# Patient Record
(demographics unavailable — no encounter records)

---

## 2017-01-01 NOTE — RADIOLOGY REPORT
EXAMINATION:
XR CHEST
 
CLINICAL INFORMATION:
Cough, green sputum. History of neuroendocrine cancer. Rule out pneumonia.
 
COMPARISON:
05/23/2015.
 
TECHNIQUE:
PA and lateral views of the chest were obtained.
 
FINDINGS:
The cardiac and mediastinal silhouettes are normal in appearance. There is
mild peribronchial thickening. The lungs and pleural spaces are clear.
 
Left cervical rib is redemonstrated synostosis to the left first thoracic rib.
 
IMPRESSION:
No acute cardiopulmonary abnormalities demonstrated.

## 2017-01-01 NOTE — ED SKIN/ALLERGY COMPLAINT
History of Present Illness
 
General
Chief Complaint: Skin Rash/ Abcess
Stated Complaint: HIVES
Source: patient
Exam Limitations: no limitations
 
Vital Signs & Intake/Output
Vital Signs & Intake/Output
 Vital Signs
 
 
Date Time Temp Pulse Resp B/P Pulse O2 O2 Flow FiO2
 
     Ox Delivery Rate 
 
 0751 97.4 85 16 156/78 987 Room Air  
 
 
Allergies
Coded Allergies:
MDX - Cephalosporin (CEPHALOSPORIN) (Severe, ANAPHYLAXIS 05/23/15)
 
Reconcile Medications
Ciclesonide (Omnaris) 50 MCG SPRAY.PUMP   2 SPRAY MAHENDRA PRN SINUSES  (Reported)
Hydroxyzine Pamoate (Vistaril) 50 MG CAPSULE   1 CAP PO TID PRN URTICARIA
Insulin Detemir (Levemir Flextouch) 100 UNIT/ML (3 ML) INSULN.PEN   14 UNITS SC 
QAM DIABETES  (Reported)
Levalbuterol Tartrate (Xopenex HFA) 45 MCG/ACTUATION HFA.AER.AD   2 PUF INH Q4-6
PRN PRN ASTHMA  (Reported)
Levofloxacin (Levaquin) 500 MG TABLET   1 TAB PO QDAY BRONCHITIS
Lorazepam (Ativan) 1 MG TAB   1 TAB PO PRN ANXIETY  (Reported)
Lovastatin (Mevacor) 20 MG TABLET   60 MG PO QPM CHOLESTEROL  (Reported)
METFORMIN HCL (Metformin HCl ER) 1,000 MG TAB.ER.24   1.5 TAB PO AD DIABETES  (
Reported)
Methimazole 5 MG TABLET   0.5 TAB PO DAILY THYROID  (Reported)
Octreotide Acetate (Sandostatin) (Unknown Strength) AMPUL   (Unknown Dose) IM 
Q30D CANCER-NEUROENDOCRINE  (Reported)
Oxycodone Hydrochloride (Oxycodone) 5 MG TAB   2-3 TAB PO QPM PAIN  (Reported)
Pantoprazole Sodium 40 MG TABLET.DR   1 TAB PO QPM GI  (Reported)
Prednisone 20 MG TABLET   2 TAB PO QDAY BRONCHITIS
Ramipril (Altace) 5 MG CAPSULE   1 CAP PO DAILY PROTECT KIDNEYS  (Reported)
 
Triage Note:
PT STATES SHE HAS CA AND DR. KAY TOLD HER THAT
 "IT'S LIKE SHE IS ALLERGIC TO HERSELF".  PT STATES
 SHE NOTICES SHE HAD HIVES THIS AM AND SHE HAS BEEN
 PUTTING CORTISONE CREAM ON IT FOR THE PAST WEEK.
 PT STATES SHE DOES HAVE ANAPHALACSIS AT TIMES.  PT
 HAS NO DIFFICULTY BREATHING AT THIS TIME.  PT
 STATES SHE TOOK BENADRYL LAST NIGHT PRIOR TO GOING
 TO BED AND STATES THIS AM THE HIVES GOT WORSE.
Triage Nurses Notes Reviewed? yes
Onset: Gradual
Duration: week(s): (1)
Timing: multiple episodes today
Severity: moderate
Location: torso, extremities
Possible Factors: neuroendocreine cancer
Associated Symptoms: hives, anxiety, dyspnea
HPI:
This is a 61-year-old female with history of neuroendocrine cancer on 
Sandostatin who presents to the ER with chief complaint of urticarial rash on 
her body for the past one week.  She states this morning she was worried she 
might develop anaphylaxis.  She states she took Benadryl last night which helped
with the itching but the hives are still there.  She saw her oncologist Dr. Hickey
in the office last week and he told her it was part of her cancer diagnosis to 
have reactions like this.  She states that she also had an episode of coughing 
up green phlegm is worried that she might develop pneumonia.  Patient is still 
an active daily smoker.
 
Past History
 
Travel History
Traveled to [place] past 21 day No
 
Medical History
Any Pertinent Medical History? see below for history
Neurological: NONE
EENT: NONE
Cardiovascular: NONE
Respiratory: asthma
Gastrointestinal: NONE
Hepatic: NONE
Renal: NONE
Musculoskeletal: NONE
Psychiatric: NONE
Endocrine: diabetes, hyperthyroidism
Blood Disorders: NONE
Cancer(s): NEURO ENDOCRINE CA
GYN/Reproductive: NONE
 
Surgical History
Surgical History: non-contributory
 
Psychosocial History
What is your primary language English
Tobacco Use: Current Daily Use
Daily Tobacco Use Amount/Type: => 5 Cigarettes daily
ETOH Use: denies use
Illicit Drug Use: denies illicit drug use
 
Family History
Hx Contributory? No
 
Review of Systems
 
Review of Systems
Constitutional:
Denies: chills, fever. 
EENTM:
Denies: throat pain. 
Respiratory:
Reports: short of breath.  Denies: cough. 
Cardiovascular:
Denies: chest pain, palpitations, peripheral edema. 
GI:
Denies: abdominal pain, diarrhea, vomiting. 
Genitourinary:
Reports: no symptoms. 
Musculoskeletal:
Reports: no symptoms. 
Skin:
Reports: rash. 
Neurological/Psychological:
Reports: anxiety. 
Hematologic/Endocrine:
Denies: bruising, bleeding, polyuria, polydipsia. 
Immunologic/Allergic:
Denies: splenectomy. 
All Other Systems: Reviewed and Negative
 
Physical Exam
 
Physical Exam
General Appearance: well developed/nourished, alert, awake, anxious, mild 
distress
Head: atraumatic
Eyes:
Bilateral: PERRL, EOMI. 
Ears, Nose, Throat: normal pharynx, normal ENT inspection, hearing grossly 
normal
Neck: normal inspection, supple
Respiratory: normal breath sounds
Cardiovascular: regular rate/rhythm
Peripheral Pulses:
2+ radial (R), 2+ radial (L)
Gastrointestinal: soft, non-tender
Back: normal inspection
Extremities: normal inspection, normal range of motion, no edema
Neurologic/Psych: awake, alert, oriented x 3, normal mood/affect
Skin: intact, normal color, warm/dry, rash
Skin Problem Location: torso
Skin Problem Character: urticarial
Lymphatic: no anterior cervical jennifer
 
Progress
Differential Diagnosis: allergic reaction, SIDE EFFECTS OF CANCER TREATMENTS
Plan of Care:
 Orders
 
 
Procedure Date/time Status
 
XRY-CHEST XRAY, PA AND LATERAL 821 Active
 
 
Diagnostic Imaging:
Viewed by Me: Radiology Read.  Discussed w/RAD: Radiology Read. 
Comments:
PATIENT: ARMANDO TAM  MEDICAL RECORD NO: [id number]
PRESENT AGE: 61  PATIENT ACCOUNT NO: [id number]
: 55  LOCATION: [facility]
ORDERING PHYSICIAN: OKSANA CROWDER MD  
 
  SERVICE DATE: 
EXAM TYPE: RAD - XRY-CHEST XRAY, PA AND LATERAL
 
EXAMINATION:
XR CHEST
 
CLINICAL INFORMATION:
Cough, green sputum. History of neuroendocrine cancer. Rule out pneumonia.
 
COMPARISON:
2015.
 
TECHNIQUE:
PA and lateral views of the chest were obtained.
 
FINDINGS:
The cardiac and mediastinal silhouettes are normal in appearance. There is
mild peribronchial thickening. The lungs and pleural spaces are clear.
 
Left cervical rib is redemonstrated synostosis to the left first thoracic rib.
 
IMPRESSION:
No acute cardiopulmonary abnormalities demonstrated.
 
DICTATED BY: LILIANA FERRARA MD 
DATE/TIME DICTATED:17
:RAD.HERMOSILLO 
DATE/TIME TRANSCRIBED:17
 
CONFIDENTIAL, DO NOT COPY WITHOUT APPROPRIATE AUTHORIZATION.
 
 <Electronically signed in Other Vendor System>                                 
          
                                           SIGNED BY: LILIANA FERRARA MD 17
 
 
 
 
Departure
 
Departure
Time of Disposition: 938
Disposition: HOME OR SELF CARE
Condition: Stable
Clinical Impression
Primary Impression: Urticaria
Referrals:
SATYA ROBBINS MD (PCP/Family)
 
Referred to [facility] as new patient No
Additional Instructions:
Take the Atarax as directed.  Follow-up with your oncologist in the office.  
Return to ER as needed.  Your chest x-ray was normal.  Please stop smoking.
Departure Forms:
Customer Survey
General Discharge Information
Prescriptions:
Current Visit Scripts
Hydroxyzine Pamoate (Vistaril) 1 CAP PO TID PRN URTICARIA
     #30 CAP

## 2017-05-19 NOTE — ED INFLUENZA/URI COMPLAINT
History of Present Illness
 
General
Chief Complaint: Dyspnea (COPD, CHF, Other)
Stated Complaint: BIBA SOB W/ CONGESTION
Source: patient, family, old records
Exam Limitations: no limitations
 
Vital Signs & Intake/Output
Vital Signs & Intake/Output
 Vital Signs
 
 
Date Time Temp Pulse Resp B/P B/P Pulse O2 O2 Flow FiO2
 
     Mean Ox Delivery Rate 
 
2253 98.0 82 18 118/84  97 Room Air  
 
2040      97   
 
2030      98 Room Air  
 
2026 97.9 84 16 156/74  98 Room Air  
 
 
Allergies
Coded Allergies:
Cephalosporins (Severe, ANAPHYLAXIS 17)
 
Reconcile Medications
Alprazolam (Xanax XR) 0.5 MG TAB.ER.24H   1 TAB PO TID PRN ANXIETY  (Reported)
Canagliflozin (Invokana) 100 MG TABLET   1 TAB PO DAILY DM  (Reported)
Ciclesonide (Omnaris) 50 MCG SPRAY.PUMP   2 SPRAY MAHENDRA PRN SINUSES  (Reported)
Clotrimazole 10 MG MARIA LUZ   1 TAB PO 5 TIMES A DAY PRN THRUSH  (Reported)
Epinephrine (Epipen) 0.3 MG/0.3 ML AUTO.INJCT   1 UNIT IM ONCE PRN ANAPHYLAXIS  
(Reported)
Fluticasone Propionate 50 MCG/ACTUATION SPRAY.SUSP   2 SPRAY NASB DAILY ASTHMA  
(Reported)
Hydroxyzine HCl 10 MG TABLET   1 TAB PO DAILY    (Reported)
Insulin Detemir (Levemir Flextouch) 100 UNIT/ML (3 ML) INSULN.PEN   14 UNITS SC 
QAM DM  (Reported)
Levalbuterol Tartrate (Xopenex Hfa) 45 MCG/ACTUATION HFA.AER.AD   2 PUF INH Q4-6
PRN PRN ASTHMA  (Reported)
Loratadine (Claritin) 10 MG CAPSULE   1 TAB PO TID ALLERGIES  (Reported)
Lovastatin (Altoprev) 60 MG TAB.ER.24H   1 TAB PO QPM CHOLESTEROL  (Reported)
Metformin HCl (Metformin HCl ER) 1,000 MG TAB.ER.24   1 TAB PO DAILY DM  (
Reported)
Methimazole 5 MG TABLET   0.5 TAB PO DAILY THYROID  (Reported)
Methylprednisolone. (Medrol) 4 MG TAB.DS.PK   1 DP PO AD INFLAMMATION
     6 on day 1 then reduce by one tablet daily until gone
Pantoprazole Sodium 40 MG TABLET.DR   1 TAB PO QPM GI  (Reported)
Ramipril (Altace) 5 MG CAPSULE   1 CAP PO DAILY PROTECT KIDNEYS  (Reported)
 
Triage Nurses Notes Reviewed? yes
Onset: Abrupt
Duration: intermittent
Timing: multiple episodes today
Severity: severe
Severity Numbers: 7
HPI:
Patient is a 61-year-old female with past medical history of diabetes, 
hyperthyroidism, hyperlipidemia, anxiety, asthma, allergies, neuro endocrine 
carcinoma who presents emergency room and was today patient woke up in her 
normal state of health and wished this morning patient had acute onset of heart 
palpitations would made patient anxious and short of breath patient took 
treatments of albuterol puffer and had complete resolution of symptoms.  Patient
states that she was outside all day however 2 hours prior to arrival patient had
acute onset of again heart palpitations which made patient anxious and short of 
breath patient also felt chest congestion and head congestion and allergy like 
symptoms where she was brought in by ambulance.  Patient is ever day smoker.  
Denies any fevers chills chest pain arm pain jaw pain nausea vomiting leg 
swelling hemoptysis.
 
(CORAZON MITCHELL)
 
Past History
 
Travel History
Traveled to [place] past 21 day No
 
Medical History
Any Pertinent Medical History? see below for history
Neurological: NONE
EENT: NONE
Cardiovascular: NONE
Respiratory: asthma
Gastrointestinal: NONE
Hepatic: NONE
Renal: NONE
Musculoskeletal: NONE
Psychiatric: NONE
Endocrine: diabetes, hyperthyroidism
Blood Disorders: NONE
Cancer(s): NEURO ENDOCRINE CA
GYN/Reproductive: NONE
 
Surgical History
Surgical History: non-contributory
 
Psychosocial History
What is your primary language English
Tobacco Use: Current Daily Use
Daily Tobacco Use Amount/Type: => 5 Cigarettes daily
 
Family History
Hx Contributory? No
(CORAZON MITCHELL)
 
Review of Systems
 
Review of Systems
Constitutional:
Denies: chills, fever. 
EENTM:
Reports: no symptoms. 
Respiratory:
Reports: see HPI, short of breath. 
Cardiovascular:
Reports: see HPI, palpitations.  Denies: chest pain. 
GI:
Reports: no symptoms. 
Genitourinary:
Reports: no symptoms. 
Musculoskeletal:
Reports: no symptoms. 
Skin:
Reports: no symptoms. 
Neurological/Psychological:
Reports: no symptoms. 
Hematologic/Endocrine:
Reports: no symptoms. 
Immunologic/Allergic:
Reports: no symptoms. 
All Other Systems: Reviewed and Negative
(CORAZON MITCHELL)
 
Physical Exam
 
Physical Exam
General Appearance: anxious
Ears, Nose, Throat: normal ENT inspection, moist mucous membrane, hearing 
grossly normal
Comments:
HEENT: Normal EENT exam, extraocular motion intact, no nystagmus. Pupils equally
round and reactive to light and accommodation. Nose is atraumatic. External 
auditory canal and Tympanic membranes clear. Pharynx normal. No swelling or 
edema. 
Neck: Supple, no lymphadenopathy, normal range of motion without pain or 
tenderness
Back: Nontender, no CVA tenderness.
Cardiovascular: Regular rate and rhythms no murmurs rubs or gallops, normal JVP
Respiratory: Chest nontender. No respiratory distress.  Bilateral mild 
expiratory wheezing
Abdomen: Soft, nontender nondistended, no appreciable organomegaly. Normal bowel
sounds. No ascites
Extremity: No edema, no calf tenderness to palpation, normal and equal pulses.
Neuro: Alert oriented x3, motor sensory normal, cranial nerves II through XII 
grossly intact.
Skin: No appreciable rash on exposed skin, skin is warm and dry.
Psych: Mood and affect is normal, memory and judgment is normal.
 
Core Measures
Severe Sepsis Present: No
Septic Shock Present: No
(ILDA LEMUS,CORAZON)
 
Progress
Differential Diagnosis: influenza, meningitis, neutropenia, otitis, pneumonia, 
pharyngitis, sinusitis
Plan of Care:
 Orders
 
 
Procedure Date/time Status
 
THYROID STIMULATING HORMONE 2054 Complete
 
FREE T4 2054 Complete
 
Telemetry/Cardiac Monitor 2034 Active
 
TROPONIN LEVEL 2018 Complete
 
D-DIMER 2018 Complete
 
COMPREHENSIVE METABOLIC PANEL 2018 Complete
 
CBC WITHOUT DIFFERENTIAL 2018 Complete
 
EKG 2018 Active
 
 
 Laboratory Tests
 
 
 
17:
Anion Gap 11, Estimated GFR > 60, BUN/Creatinine Ratio 20.0, Glucose 163  H, 
Calcium 9.6, Total Bilirubin 0.3, AST 17, ALT 18, Alkaline Phosphatase 82, 
Troponin I < 0.01, Total Protein 7.1, Albumin 4.1, Globulin 3.0, Albumin/
Globulin Ratio 1.4, TSH 0.350, Free T4 1.17, D-Dimer 664  H, CBC w Diff NO MAN 
DIFF REQ, RBC 4.40, MCV 84.7, MCH 27.5, RDW 15.6  H, MPV 8.1, Gran % 60.2, 
Lymphocytes % 26.9, Monocytes % 10.8  H, Eosinophils % 1.6, Basophils % 0.5, 
Absolute Granulocytes 5.9, Absolute Lymphocytes 2.6, Absolute Monocytes 1.1  H, 
Absolute Eosinophils 0.2, Absolute Basophils 0, PUBS MCHC 32.5  L
 
17:
TSH Cancelled, Free T4 Cancelled
2017 9:19:01 PM after nebulizer was administered patient had complete 
resolution of wheezing noted on reexamination.  Oxygen saturation 99% patient no
respiratory distress telemetry monitored noted to be normal sinus rhythm 88 bpm
Patient also is resting comfortably no apparent distress
 
When discussing with patient of an elevated d-dimer and CT angiogram she became 
extremely anxious to have this imaged performance where she was given 1 mg of 
Ativan.
(CORAZON MITCHELL)
Diagnostic Imaging:
Viewed by Me: CT Scan. 
Initial ED EK BPM, NSR
Hand-Off
   Endorsed To:
LONNIE GAVIRIA DO (TBS)
   Endorsed Time: 
   Pending: CT
Comments:
Discussed this with Dr. GAVIRIA for concerns of hand for which patient disposition
plan is pending a CT angiogram.  Patient currently is resting comfortably in no 
apparent distress.  Patient was strongly advised to discontinue smoking and to 
follow-up with disposition and plan dictated and discharge instructions
(CORAZON MITCHELL)
 
Departure
 
Departure
Disposition: HOME OR SELF CARE
Condition: Stable
Clinical Impression
Primary Impression: Palpitations
Secondary Impressions: COPD (chronic obstructive pulmonary disease)
Referrals:
JOE RIVERA,CLAUS ROBBINS MD,SATYA (PCP/Family)
 
PAULINA RIVERA,PARDEEP
 
Additional Instructions:
As discussed please discontinue smoking.  Begin the prescription of Medrol 
Dosepak for inflammation
Continue previous medications especially YOUR breathing medications.  On Monday 
please follow up and establish pulmonologist Dr. Santos and cardiologist Dr. Saldana for your symptoms.  If symptoms worsen return to emergency room
Departure Forms:
Customer Survey
General Discharge Information
Prescriptions:
Current Visit Scripts
Methylprednisolone. (Medrol) 1 DP PO AD  
     #1 DP 
     6 on day 1 then reduce by one tablet daily until gone
 
 
(CORAZON MITCHELL)
 
Departure
Comments
 
 
 
17
12:12 am
The patient was signed out to me by Corazon Molina to follow the CTA.  CTA is 
negative for pulmonary embolism.  Liver densities are unchanged.  She will 
follow-up with her primary care doctor this week or return to the emergency 
department if worse.PATIENT: ARMANDO TAM  MEDICAL RECORD NO: [id number]
PRESENT AGE: 61  PATIENT ACCOUNT NO: [id number]
: 55  LOCATION: [facility]
ORDERING PHYSICIAN: CORAZON LEMUS  
 
  SERVICE DATE: 
EXAM TYPE: CAT - CTA CHEST-PULMONARY EMBOLISM
 
EXAMINATION:
CT ANGIOGRAM CHEST WITH AND WITHOUT CONTRAST (CT PULMONARY ANGIOGRAM FOR PE)
 
CLINICAL INFORMATION:
Shortness of breath. Elevated D-dimer. History of cancer. Pulmonary embolism
symptoms.
 
COMPARISON:
CT chest and abdomen 2017. Chest x-ray 2017.
 
TECHNIQUE:
Prior to contrast administration, noncontrast localization images were
obtained. Subsequently, multidetector volumetric imaging was performed from
the thoracic inlet to below the diaphragms following the administration of 50
mL Optiray 350 intravenous contrast.
No contrast reaction reported.
Sagittal, coronal, and MIP oblique sagittal reformatted images were obtained
on the CT workstation, uploaded to PACS, and reviewed.
Total exam dose-length product 269 mGy-cm.
 
FINDINGS:
 
QUALITY OF STUDY/CONTRAST BOLUS: Satisfactory
 
PULMONARY ARTERIES: No central or segmental pulmonary emboli.
 
THORACIC AORTA: Mild to moderate calcific atherosclerotic disease.
 
LUNG: Minimal groundglass opacity noted within the medial aspect of the right
lobe of the liver seen on axial image 50\E\62 series 3.
 
PLEURA: No pleural effusion or pneumothorax.
 
MEDIASTINUM: Normal heart size. No pericardial effusion. No hilar or
mediastinal lymphadenopathy. No evidence of septal bowing or right heart
strain.
Heterogeneous-appearing thyroid, unchanged.
 
CHEST WALL/AXILLA: No axillary or internal mammary lymphadenopathy.
 
OSSEOUS STRUCTURES: Mild spondylosis of the dorsal spine manifested by
endplate osteophyte formation at multiple disc levels. No change.
 
UPPER ABDOMEN: Multiple small hypodense liver lesions noted throughout the
visualized portion of the liver, unchanged. The largest cysts in the left
lobe also most likely reflect simple cysts.
 
IMPRESSION:
1. No evidence for pulmonary embolism.
 
2. Minimal groundglass opacity in the medial aspect of the right lower lobe
most likely reflects atelectasis rather than an evolving infiltrate or
pneumonia.
 
3. Heterogeneous-appearing thyroid, unchanged.
 
4. Persistent unchanged multiple low-density lesion is noted throughout much
of the liver visualized some of which are too small to clearly characterize.
The larger masses do appear to reflect benign simple cysts.
 
DICTATED BY: MARLO KENNEDY MD 
DATE/TIME DICTATED:17
:RAD.HERMOSILLO 
DATE/TIME TRANSCRIBED:17
 
CONFIDENTIAL, DO NOT COPY WITHOUT APPROPRIATE AUTHORIZATION.
 
 <Electronically signed in Other Vendor System>                                 
          
                                           SIGNED BY: MARLO KENNEDY MD  [id number]
 
 
 
(LONNIE GAVIRIA DO)

## 2017-05-20 NOTE — CT SCAN REPORT
EXAMINATION:
CT ANGIOGRAM CHEST WITH AND WITHOUT CONTRAST (CT PULMONARY ANGIOGRAM FOR PE)
 
CLINICAL INFORMATION:
Shortness of breath. Elevated D-dimer. History of cancer. Pulmonary embolism
symptoms.
 
COMPARISON:
CT chest and abdomen 05/08/2017. Chest x-ray January 2017.
 
TECHNIQUE:
Prior to contrast administration, noncontrast localization images were
obtained. Subsequently, multidetector volumetric imaging was performed from
the thoracic inlet to below the diaphragms following the administration of 50
mL Optiray 350 intravenous contrast.
No contrast reaction reported.
Sagittal, coronal, and MIP oblique sagittal reformatted images were obtained
on the CT workstation, uploaded to PACS, and reviewed.
Total exam dose-length product 269 mGy-cm.
 
FINDINGS:
 
QUALITY OF STUDY/CONTRAST BOLUS: Satisfactory
 
PULMONARY ARTERIES: No central or segmental pulmonary emboli.
 
THORACIC AORTA: Mild to moderate calcific atherosclerotic disease.
 
LUNG: Minimal groundglass opacity noted within the medial aspect of the right
lobe of the liver seen on axial image 50\E\62 series 3.
 
PLEURA: No pleural effusion or pneumothorax.
 
MEDIASTINUM: Normal heart size. No pericardial effusion. No hilar or
mediastinal lymphadenopathy. No evidence of septal bowing or right heart
strain.
Heterogeneous-appearing thyroid, unchanged.
 
CHEST WALL/AXILLA: No axillary or internal mammary lymphadenopathy.
 
OSSEOUS STRUCTURES: Mild spondylosis of the dorsal spine manifested by
endplate osteophyte formation at multiple disc levels. No change.
 
UPPER ABDOMEN: Multiple small hypodense liver lesions noted throughout the
visualized portion of the liver, unchanged. The largest cysts in the left
lobe also most likely reflect simple cysts.
 
IMPRESSION:
1. No evidence for pulmonary embolism.
 
2. Minimal groundglass opacity in the medial aspect of the right lower lobe
most likely reflects atelectasis rather than an evolving infiltrate or
pneumonia.
 
3. Heterogeneous-appearing thyroid, unchanged.
 
4. Persistent unchanged multiple low-density lesion is noted throughout much
of the liver visualized some of which are too small to clearly characterize.
The larger masses do appear to reflect benign simple cysts.

## 2017-05-21 NOTE — ED NEURO DEFICIT/STROKE
History of Present Illness
 
General
Chief Complaint: Neuro Symptoms/ Deficit
Stated Complaint: LEFT SIDED FACIAL NUMBNESS
Source: patient, family, old records
Exam Limitations: no limitations
 
Vital Signs & Intake/Output
Vital Signs & Intake/Output
 ED Intake and Output
 
 
 05/22 0000 05/21 1200
 
Intake Total 0 
 
Output Total  
 
Balance 0 
 
   
 
Intake, Oral 0 
 
Patient 158 lb 
 
Weight  
 
Weight Reported by Patient 
 
Measurement  
 
Method  
 
 
Allergies
Coded Allergies:
Cephalosporins (Severe, ANAPHYLAXIS 05/21/17)
Uncoded Allergies:
METAL (FLUSH, HIVES 05/21/17)
 
Reconcile Medications
Alprazolam 0.5 MG TABLET   1 TAB PO TID ANXIETY  (Reported)
Budesonide/Formoterol Fumarate (Symbicort 160-4.5 Mcg Inhaler) 160 MCG-4.5 MCG/
ACTUATION HFA.AER.AD   1 PUF INH BID PRN ASTHMA  (Reported)
Canagliflozin (Invokana) 100 MG TABLET   1 TAB PO DAILY DM  (Reported)
Ciclesonide (Omnaris) 50 MCG SPRAY.PUMP   2 SPRAY MAHENDRA PRN SINUSES  (Reported)
Clotrimazole 10 MG MARIA LUZ   1 TAB PO 5 TIMES A DAY PRN THRUSH  (Reported)
Epinephrine (Epipen) 0.3 MG/0.3 ML AUTO.INJCT   1 UNIT IM ONCE PRN ANAPHYLAXIS  
(Reported)
Fluticasone Propionate 50 MCG/ACTUATION SPRAY.SUSP   2 SPRAY NASB PRN ALLERGIES 
(Reported)
Hydroxyzine HCl 10 MG TABLET   1 TAB PO DAILY ITCHING  (Reported)
Insulin Detemir (Levemir Flextouch) 100 UNIT/ML (3 ML) INSULN.PEN   14 UNITS SC 
QAM DM  (Reported)
Levalbuterol Tartrate (Xopenex Hfa) 45 MCG/ACTUATION HFA.AER.AD   2 PUF INH Q3H 
PRN ASTHMA  (Reported)
Loratadine (Claritin) 10 MG CAPSULE   1 TAB PO TID ALLERGIES  (Reported)
Lovastatin (Altoprev) 60 MG TAB.ER.24H   1 TAB PO QPM CHOLESTEROL  (Reported)
Metformin HCl (Metformin HCl ER) 1,000 MG TAB.ER.24   1 TAB PO DAILY DM  (
Reported)
Metformin HCl 1,000 MG TABLET   1 TAB PO QPM DM  (Reported)
Methimazole 5 MG TABLET   0.5 TAB PO DAILY THYROID  (Reported)
Methylprednisolone. (Medrol) 4 MG TAB.DS.PK   1 DP PO AD INFLAMMATION
     6 on day 1 then reduce by one tablet daily until gone
Moxifloxacin HCl (Avelox) 400 MG TABLET   1 TAB PO DAILY bronchitis
Pantoprazole Sodium 40 MG TABLET.DR   1 TAB PO QPM GI  (Reported)
Ramipril (Altace) 5 MG CAPSULE   1 CAP PO DAILY PROTECT KIDNEYS  (Reported)
Repaglinide 0.5 MG TABLET   1 TAB PO TID DM  (Reported)
 
Triage Note:
PT TO ED FOR L SIDED FACIAL NUMBNESS/TINGLING THAT
 STARTED THIS MORNING AROUND 1000, THEN WENT AWAY
 AND THEN CAME BACK AROUND 1400. PT SEEN IN THIS ED
 ON FRIDAY AND WAS WORKED UP FOR PE WHICH WAS
 NEGATIVE. PT HX OF NEUROENDOCRINE CANCER,
 CURRENTLY ON CHEMO. ALL NEUROS INTACT, NO FACIAL
 DROOP, OR SLURRED SPEECH NOTED.
Triage Nurses Notes Reviewed? yes
Onset: Abrupt
Duration: hour(s):, intermittent
Timing: recent history
Altered Sensations: left facial
Vision Problem? No
Glaucoma? No
Baseline: alert, oriented x 3
HPI:
61-year-old female comes into emergency room with complaints of left-sided 
facial numbness is been going on since noon today intermittently.  She denies 
any slurred speech confusion or headache.  She denies any numbness or tingling 
to her left lower leg or left left upper extremity.  She says she might have 
some mild tingling in her left pinky finger.  She has no weakness anywhere on 
her body.  She denies any vision changes.  Denies any recent falls or trauma.  
She denies any gait abnormalities.  Denies any vomiting fever or chills.  
Patient was seen here a few nights ago and was put on steroids for possible 
COPD.  Nothing seems to make the symptoms better or worse.  Patient comes in for
further evaluation.
(PHIL BARRAZA)
 
Past History
 
Travel History
Traveled to [place] past 21 day No
 
Medical History
Any Pertinent Medical History? see below for history
Neurological: NONE
EENT: NONE
Cardiovascular: hyperlipidemia
Respiratory: asthma
Gastrointestinal: NONE
Hepatic: NONE
Renal: NONE
Musculoskeletal: NONE
Psychiatric: NONE
Endocrine: diabetes, hyperthyroidism, GOITER
Blood Disorders: NONE
Cancer(s): NEURO ENDOCRINE CA
GYN/Reproductive: NONE
 
Surgical History
Surgical History: non-contributory
 
Psychosocial History
What is your primary language English
Tobacco Use: Current Daily Use
Daily Tobacco Use Amount/Type: => 5 Cigarettes daily
ETOH Use: denies use
Illicit Drug Use: denies illicit drug use
 
Family History
Hx Contributory? No
(PHIL BARRAZA)
 
Review of Systems
 
Review of Systems
Constitutional:
Reports: no symptoms. 
EENTM:
Reports: no symptoms. 
Respiratory:
Reports: no symptoms. 
Cardiovascular:
Reports: no symptoms. 
GI:
Reports: no symptoms. 
Genitourinary:
Reports: no symptoms. 
Musculoskeletal:
Reports: no symptoms. 
Skin:
Reports: no symptoms. 
Neurological/Psychological:
Reports: see HPI. 
Hematologic/Endocrine:
Reports: no symptoms. 
Immunologic/Allergic:
Reports: no symptoms. 
All Other Systems: Reviewed and Negative
(PHIL BARRAZA)
 
Physical Exam
 
Physical Exam
General Appearance: well developed/nourished, no apparent distress, alert, awake
Head: atraumatic, normal appearance
Eyes:
Bilateral: normal appearance, PERRL, EOMI. 
Ears, Nose, Throat: normal ENT inspection, moist mucous membrane, hearing 
grossly normal
Neck: normal inspection, full range of motion
Respiratory: normal breath sounds, no respiratory distress
Cardiovascular: regular rate/rhythm
Gastrointestinal: soft, non-tender
Back: normal inspection, normal range of motion
Extremities: normal range of motion
Psychiatric: awake, alert, oriented x 3
Cranial Nerves: normal hearing, normal speech, PERRL, cranial nerves 2-12 intact
, gross sensation intact
Coordination/Gait: normal finger to nose, normal gait
Motor/Sensory: no motor/sensory deficits
Skin: intact, normal color
 
Core Measures
CVA/TIA Diagnosis: No
Severe Sepsis Present: No
Septic Shock Present: No
(PHIL BARRAZA)
 
Progress
Differential Diagnosis: acute glaucoma, Bell's Palsy, drug intoxication, 
electrolyte imbalance, encephalitis, hypoglycemia, intracranial Hem., 
intracranial mass/tumor, meningitis, migraine HA, seizure disorder, stroke, 
subarachnoid Hem., vertebrobasilar insuff.
Plan of Care:
 Orders
 
 
Procedure Date/time Status
 
TROPONIN LEVEL 05/21 1623 Complete
 
LYME TITRE 05/21 1623 Active
 
COMPREHENSIVE METABOLIC PANEL 05/21 1623 Complete
 
CBC WITHOUT DIFFERENTIAL 05/21 1623 Complete
 
EKG 05/21 1623 Active
 
 
 Laboratory Tests
 
 
 
05/21/17 1638:
Anion Gap 13, Estimated GFR 46  L, BUN/Creatinine Ratio 22.5, Glucose 158  H, 
Calcium 9.7, Total Bilirubin 0.4, AST 19, ALT 28, Alkaline Phosphatase 75, 
Troponin I < 0.01, Total Protein 7.6, Albumin 4.5, Globulin 3.1, Albumin/
Globulin Ratio 1.5, CBC w Diff NO MAN DIFF REQ, RBC 4.24, MCV 84.8, MCH 27.6, 
RDW 15.9  H, MPV 8.1, Gran % 81.7  H, Lymphocytes % 12.8  L, Monocytes % 5.2, 
Eosinophils % 0, Basophils % 0.3, Absolute Granulocytes 10.6  H, Absolute 
Lymphocytes 1.7, Absolute Monocytes 0.7  H, Absolute Eosinophils 0, Absolute 
Basophils 0, PUBS MCHC 32.6  L, Lyme Disease Antibody Pending
Diagnostic Imaging:
Viewed by Me: CT Scan.  Discussed w/RAD: CT Scan. 
Radiology Impression:   SERVICE DATE: 05/21/ EXAM TYPE: CAT - CT HEAD WO 
IV CONTRAST EXAMINATION: CT HEAD WITHOUT CONTRAST CLINICAL INFORMATION: Left 
facial numbness COMPARISON: None TECHNIQUE: Contiguous axial imaging was 
performed from the skull base to vertex without intravenous administration of 
contrast. DLP: 614.97 mGy-cm FINDINGS: There is no evidence of acute 
intracranial hemorrhage or territorial infarction. No abnormal mass effect or 
midline shift is seen. Gray to white matter differentiation is well preserved. 
No extra-axial fluid collections are identified. The ventricles are normal in 
size. There is mild to moderate low-attenuation within the periventricular and 
subcortical white matter. The latter finding is slightly asymmetric on the 
right. A small area of low-attenuation is noted within the subcortical white 
matter of the right frontal lobe measuring 0.8 cm (image 16, series 3). The 
osseous structures and soft tissues are normal. The mastoid air cells and 
visualized portions of the paranasal sinuses are well aerated. IMPRESSION: No 
acute intracranial hemorrhage. Mild to moderate chronic small vessel ischemic 
change. DICTATED BY: MALCOLM CHANDRA MD  DATE/TIME DICTATED:05/21/17 / 1724 
:RAD.HERMOSILLO 
Initial ED EKG: normal intervals, normal p-waves, normal sinus rhythm, RBBB
Prior EKG: unchanged
(PHIL BARRAZA)
 
Departure
 
Departure
Disposition: HOME OR SELF CARE
Condition: Stable
Clinical Impression
Primary Impression: Left facial numbness
Referrals:
SATYA ROBBINS MD (PCP/Family)
 
Additional Instructions:
Follow-up with your primary care doctor.  Follow-up with neurologist.  Have your
creatinine rechecked.  Return if any concerns worsening symptoms.
 
Please go over all results of today's visit with your primary care doctor.  
Contact your primary care doctor to let them know you were here in the emergency
room.  There may be nonspecific findings which may not be related to your visit 
today here in the emergency room but may require further evaluation and chronic 
monitoring by your primary care doctor.  If you had a laceration today the 
chance of foreign body always remains. You should follow-up with your primary 
care doctor for recheck in 3-5 days for a wound check.  If you had an x-ray done
there is a chance that a fracture could have been missed on initial read and you
should follow-up with your primary care doctor for repeat x-rays if symptoms 
persist.  If your blood pressure was elevated here in the emergency room please 
have rechecked by her primary care doctor within the next 48 hours by your 
primary care doctor.  If you were prescribed a narcotic here in the emergency 
room or any type of controlled substances you're not allowed to drive while 
taking this medication or operate any type of heavy machinery.  Narcotics can 
make you feel lightheaded dizziness nausea and can cause constipation.  You may 
need to  a stool softener.  Thank you for choosing [facility] 
emergency room.  Please return to the emergency room immediately if you have any
other concerns worsening of symptoms.
 
Departure Forms:
Customer Survey
General Discharge Information
Comments
Patient clinically looks well.  Nontoxic-appearing.  In no apparent distress.  
Resting comfortably in room.  Case discussed with Dr. Marin.  Reevaluated 
multiple times.  No neurological deficits.  Gross sensation intact.  Patient 
referred to neurologist.  Lyme titer sent.  Patient told to follow-up with 
primary care doctor.  Return if any other concerns.  Patient understands and 
agrees with plan of care.
(BRANDI LEMUS,PHIL)
 
PA/NP Co-Sign Statement
Statement:
ED Attending supervision documentation-
 
[] I saw and evaluated the patient. I have also reviewed all the pertinent lab 
results and diagnostic results. I agree with the findings and the plan of care 
as documented in the PA's/NP's documentation. 
 
[X] I have reviewed the ED Record and agree with the PA's/NP's documentation.
 
[] Additions or exceptions (if any) to the PAs/NP's note and plan are 
summarized below:
[]
 
(BJ RIVERA,ISAÍAS OSORIO)

## 2017-05-21 NOTE — CT SCAN REPORT
EXAMINATION:
CT HEAD WITHOUT CONTRAST
 
CLINICAL INFORMATION:
Left facial numbness
 
COMPARISON:
None
 
TECHNIQUE:
Contiguous axial imaging was performed from the skull base to vertex without
intravenous administration of contrast.
 
DLP:
614.97 mGy-cm
 
FINDINGS:
There is no evidence of acute intracranial hemorrhage or territorial
infarction. No abnormal mass effect or midline shift is seen. Gray to white
matter differentiation is well preserved. No extra-axial fluid collections
are identified.
 
The ventricles are normal in size. There is mild to moderate low-attenuation
within the periventricular and subcortical white matter. The latter finding
is slightly asymmetric on the right. A small area of low-attenuation is noted
within the subcortical white matter of the right frontal lobe measuring 0.8
cm (image 16, series 3). The osseous structures and soft tissues are normal.
The mastoid air cells and visualized portions of the paranasal sinuses are
well aerated.
 
IMPRESSION:
No acute intracranial hemorrhage. Mild to moderate chronic small vessel
ischemic change.